# Patient Record
Sex: MALE | Race: WHITE | NOT HISPANIC OR LATINO | Employment: OTHER | URBAN - NONMETROPOLITAN AREA
[De-identification: names, ages, dates, MRNs, and addresses within clinical notes are randomized per-mention and may not be internally consistent; named-entity substitution may affect disease eponyms.]

---

## 2021-06-16 ENCOUNTER — HOSPITAL ENCOUNTER (INPATIENT)
Facility: OTHER | Age: 81
LOS: 2 days | Discharge: HOME | DRG: 378 | End: 2021-06-19
Attending: EMERGENCY MEDICINE | Admitting: FAMILY MEDICINE
Payer: MEDICARE

## 2021-06-16 DIAGNOSIS — K92.0 HEMATEMESIS, PRESENCE OF NAUSEA NOT SPECIFIED: ICD-10-CM

## 2021-06-16 DIAGNOSIS — Z86.73 PERSONAL HISTORY OF TRANSIENT CEREBRAL ISCHEMIA: ICD-10-CM

## 2021-06-16 DIAGNOSIS — Z11.52 ENCOUNTER FOR SCREENING LABORATORY TESTING FOR COVID-19 VIRUS: ICD-10-CM

## 2021-06-16 DIAGNOSIS — I10 ESSENTIAL HYPERTENSION, MALIGNANT: ICD-10-CM

## 2021-06-16 DIAGNOSIS — K92.2 GASTROINTESTINAL HEMORRHAGE, UNSPECIFIED GASTROINTESTINAL HEMORRHAGE TYPE: ICD-10-CM

## 2021-06-16 PROBLEM — K92.1 MELENA: Status: ACTIVE | Noted: 2021-06-16

## 2021-06-16 LAB
ABO + RH BLD: NORMAL
ABO + RH BLD: NORMAL
ALBUMIN SERPL-MCNC: 3.6 G/DL (ref 3.5–5.7)
ALP SERPL-CCNC: 52 U/L (ref 34–104)
ALT SERPL W P-5'-P-CCNC: 18 U/L (ref 7–52)
ANION GAP SERPL CALCULATED.3IONS-SCNC: 8 MMOL/L (ref 3–14)
APTT PPP: 28 SEC (ref 22–37)
AST SERPL W P-5'-P-CCNC: 16 U/L (ref 13–39)
BASOPHILS # BLD AUTO: 0.1 10E9/L (ref 0–0.2)
BASOPHILS NFR BLD AUTO: 0.6 %
BILIRUB SERPL-MCNC: 0.5 MG/DL (ref 0.3–1)
BLD GP AB SCN SERPL QL: NORMAL
BLOOD BANK CMNT PATIENT-IMP: NORMAL
BUN SERPL-MCNC: 41 MG/DL (ref 7–25)
CALCIUM SERPL-MCNC: 9.1 MG/DL (ref 8.6–10.3)
CHLORIDE SERPL-SCNC: 106 MMOL/L (ref 98–107)
CO2 SERPL-SCNC: 26 MMOL/L (ref 21–31)
CREAT SERPL-MCNC: 0.93 MG/DL (ref 0.7–1.3)
DIFFERENTIAL METHOD BLD: ABNORMAL
EOSINOPHIL # BLD AUTO: 0 10E9/L (ref 0–0.7)
EOSINOPHIL NFR BLD AUTO: 0.5 %
ERYTHROCYTE [DISTWIDTH] IN BLOOD BY AUTOMATED COUNT: 13.6 % (ref 10–15)
GFR SERPL CREATININE-BSD FRML MDRD: 78 ML/MIN/{1.73_M2}
GLUCOSE SERPL-MCNC: 152 MG/DL (ref 70–105)
HCT VFR BLD AUTO: 30.4 % (ref 40–53)
HGB BLD-MCNC: 10.1 G/DL (ref 13.3–17.7)
IMM GRANULOCYTES # BLD: 0.1 10E9/L (ref 0–0.4)
IMM GRANULOCYTES NFR BLD: 0.6 %
INR PPP: 1.06 (ref 0.86–1.14)
LABORATORY COMMENT REPORT: NORMAL
LACTATE BLD-SCNC: 1.6 MMOL/L (ref 0.7–2)
LYMPHOCYTES # BLD AUTO: 1.9 10E9/L (ref 0.8–5.3)
LYMPHOCYTES NFR BLD AUTO: 20.9 %
MCH RBC QN AUTO: 29 PG (ref 26.5–33)
MCHC RBC AUTO-ENTMCNC: 33.2 G/DL (ref 31.5–36.5)
MCV RBC AUTO: 87 FL (ref 78–100)
MONOCYTES # BLD AUTO: 0.7 10E9/L (ref 0–1.3)
MONOCYTES NFR BLD AUTO: 7.8 %
NEUTROPHILS # BLD AUTO: 6.2 10E9/L (ref 1.6–8.3)
NEUTROPHILS NFR BLD AUTO: 69.6 %
PLATELET # BLD AUTO: 208 10E9/L (ref 150–450)
POTASSIUM SERPL-SCNC: 3.7 MMOL/L (ref 3.5–5.1)
PROT SERPL-MCNC: 6.4 G/DL (ref 6.4–8.9)
RBC # BLD AUTO: 3.48 10E12/L (ref 4.4–5.9)
SARS-COV-2 RNA RESP QL NAA+PROBE: NEGATIVE
SODIUM SERPL-SCNC: 140 MMOL/L (ref 134–144)
SPECIMEN EXP DATE BLD: NORMAL
SPECIMEN SOURCE: NORMAL
WBC # BLD AUTO: 8.8 10E9/L (ref 4–11)

## 2021-06-16 PROCEDURE — U0005 INFEC AGEN DETEC AMPLI PROBE: HCPCS | Performed by: EMERGENCY MEDICINE

## 2021-06-16 PROCEDURE — 80053 COMPREHEN METABOLIC PANEL: CPT | Performed by: EMERGENCY MEDICINE

## 2021-06-16 PROCEDURE — 86900 BLOOD TYPING SEROLOGIC ABO: CPT | Performed by: EMERGENCY MEDICINE

## 2021-06-16 PROCEDURE — 85610 PROTHROMBIN TIME: CPT | Performed by: EMERGENCY MEDICINE

## 2021-06-16 PROCEDURE — 36415 COLL VENOUS BLD VENIPUNCTURE: CPT | Performed by: EMERGENCY MEDICINE

## 2021-06-16 PROCEDURE — 999N000157 HC STATISTIC RCP TIME EA 10 MIN

## 2021-06-16 PROCEDURE — 99285 EMERGENCY DEPT VISIT HI MDM: CPT | Performed by: EMERGENCY MEDICINE

## 2021-06-16 PROCEDURE — 86850 RBC ANTIBODY SCREEN: CPT | Performed by: EMERGENCY MEDICINE

## 2021-06-16 PROCEDURE — 83605 ASSAY OF LACTIC ACID: CPT | Performed by: EMERGENCY MEDICINE

## 2021-06-16 PROCEDURE — 258N000003 HC RX IP 258 OP 636: Performed by: EMERGENCY MEDICINE

## 2021-06-16 PROCEDURE — G0378 HOSPITAL OBSERVATION PER HR: HCPCS

## 2021-06-16 PROCEDURE — 99285 EMERGENCY DEPT VISIT HI MDM: CPT | Mod: 25 | Performed by: EMERGENCY MEDICINE

## 2021-06-16 PROCEDURE — 93010 ELECTROCARDIOGRAM REPORT: CPT | Performed by: INTERNAL MEDICINE

## 2021-06-16 PROCEDURE — 85025 COMPLETE CBC W/AUTO DIFF WBC: CPT | Performed by: EMERGENCY MEDICINE

## 2021-06-16 PROCEDURE — 93005 ELECTROCARDIOGRAM TRACING: CPT | Performed by: EMERGENCY MEDICINE

## 2021-06-16 PROCEDURE — 258N000003 HC RX IP 258 OP 636: Performed by: FAMILY MEDICINE

## 2021-06-16 PROCEDURE — 96361 HYDRATE IV INFUSION ADD-ON: CPT | Performed by: EMERGENCY MEDICINE

## 2021-06-16 PROCEDURE — 96374 THER/PROPH/DIAG INJ IV PUSH: CPT | Performed by: EMERGENCY MEDICINE

## 2021-06-16 PROCEDURE — 250N000011 HC RX IP 250 OP 636: Performed by: EMERGENCY MEDICINE

## 2021-06-16 PROCEDURE — 86901 BLOOD TYPING SEROLOGIC RH(D): CPT | Performed by: EMERGENCY MEDICINE

## 2021-06-16 PROCEDURE — C9113 INJ PANTOPRAZOLE SODIUM, VIA: HCPCS | Performed by: EMERGENCY MEDICINE

## 2021-06-16 PROCEDURE — U0003 INFECTIOUS AGENT DETECTION BY NUCLEIC ACID (DNA OR RNA); SEVERE ACUTE RESPIRATORY SYNDROME CORONAVIRUS 2 (SARS-COV-2) (CORONAVIRUS DISEASE [COVID-19]), AMPLIFIED PROBE TECHNIQUE, MAKING USE OF HIGH THROUGHPUT TECHNOLOGIES AS DESCRIBED BY CMS-2020-01-R: HCPCS | Performed by: EMERGENCY MEDICINE

## 2021-06-16 PROCEDURE — C9803 HOPD COVID-19 SPEC COLLECT: HCPCS | Performed by: EMERGENCY MEDICINE

## 2021-06-16 PROCEDURE — 85730 THROMBOPLASTIN TIME PARTIAL: CPT | Performed by: EMERGENCY MEDICINE

## 2021-06-16 RX ORDER — PROCHLORPERAZINE 25 MG
12.5 SUPPOSITORY, RECTAL RECTAL EVERY 12 HOURS PRN
Status: DISCONTINUED | OUTPATIENT
Start: 2021-06-16 | End: 2021-06-19 | Stop reason: HOSPADM

## 2021-06-16 RX ORDER — PROCHLORPERAZINE MALEATE 5 MG
5 TABLET ORAL EVERY 6 HOURS PRN
Status: DISCONTINUED | OUTPATIENT
Start: 2021-06-16 | End: 2021-06-19 | Stop reason: HOSPADM

## 2021-06-16 RX ORDER — SODIUM CHLORIDE 9 MG/ML
INJECTION, SOLUTION INTRAVENOUS CONTINUOUS
Status: DISCONTINUED | OUTPATIENT
Start: 2021-06-16 | End: 2021-06-17

## 2021-06-16 RX ORDER — ONDANSETRON 4 MG/1
4 TABLET, ORALLY DISINTEGRATING ORAL EVERY 6 HOURS PRN
Status: DISCONTINUED | OUTPATIENT
Start: 2021-06-16 | End: 2021-06-19 | Stop reason: HOSPADM

## 2021-06-16 RX ORDER — SODIUM CHLORIDE 9 MG/ML
INJECTION, SOLUTION INTRAVENOUS CONTINUOUS
Status: DISCONTINUED | OUTPATIENT
Start: 2021-06-16 | End: 2021-06-19

## 2021-06-16 RX ORDER — ACETAMINOPHEN 325 MG/1
650 TABLET ORAL EVERY 4 HOURS PRN
Status: DISCONTINUED | OUTPATIENT
Start: 2021-06-16 | End: 2021-06-19 | Stop reason: HOSPADM

## 2021-06-16 RX ORDER — ACETAMINOPHEN 650 MG/1
650 SUPPOSITORY RECTAL EVERY 4 HOURS PRN
Status: DISCONTINUED | OUTPATIENT
Start: 2021-06-16 | End: 2021-06-19 | Stop reason: HOSPADM

## 2021-06-16 RX ORDER — ONDANSETRON 2 MG/ML
4 INJECTION INTRAMUSCULAR; INTRAVENOUS EVERY 6 HOURS PRN
Status: DISCONTINUED | OUTPATIENT
Start: 2021-06-16 | End: 2021-06-19 | Stop reason: HOSPADM

## 2021-06-16 RX ADMIN — SODIUM CHLORIDE 1000 ML: 9 INJECTION, SOLUTION INTRAVENOUS at 19:43

## 2021-06-16 RX ADMIN — SODIUM CHLORIDE: 9 INJECTION, SOLUTION INTRAVENOUS at 22:50

## 2021-06-16 RX ADMIN — PANTOPRAZOLE SODIUM 80 MG: 40 INJECTION, POWDER, FOR SOLUTION INTRAVENOUS at 19:44

## 2021-06-16 ASSESSMENT — MIFFLIN-ST. JEOR: SCORE: 1623.46

## 2021-06-17 PROBLEM — K92.2 GASTROINTESTINAL HEMORRHAGE, UNSPECIFIED GASTROINTESTINAL HEMORRHAGE TYPE: Status: ACTIVE | Noted: 2021-06-16

## 2021-06-17 PROBLEM — K92.0 HEMATEMESIS, PRESENCE OF NAUSEA NOT SPECIFIED: Status: ACTIVE | Noted: 2021-06-16

## 2021-06-17 PROBLEM — R91.8 PULMONARY NODULES: Status: ACTIVE | Noted: 2021-06-17

## 2021-06-17 LAB
ANION GAP SERPL CALCULATED.3IONS-SCNC: 8 MMOL/L (ref 3–14)
BUN SERPL-MCNC: 33 MG/DL (ref 7–25)
CALCIUM SERPL-MCNC: 8.1 MG/DL (ref 8.6–10.3)
CHLORIDE SERPL-SCNC: 111 MMOL/L (ref 98–107)
CO2 SERPL-SCNC: 24 MMOL/L (ref 21–31)
CREAT SERPL-MCNC: 0.85 MG/DL (ref 0.7–1.3)
ERYTHROCYTE [DISTWIDTH] IN BLOOD BY AUTOMATED COUNT: 13.7 % (ref 10–15)
GFR SERPL CREATININE-BSD FRML MDRD: 87 ML/MIN/{1.73_M2}
GLUCOSE SERPL-MCNC: 108 MG/DL (ref 70–105)
HCT VFR BLD AUTO: 26.3 % (ref 40–53)
HGB BLD-MCNC: 8 G/DL (ref 13.3–17.7)
HGB BLD-MCNC: 8.5 G/DL (ref 13.3–17.7)
HGB BLD-MCNC: 8.9 G/DL (ref 13.3–17.7)
INTERPRETATION ECG - MUSE: NORMAL
MCH RBC QN AUTO: 28.2 PG (ref 26.5–33)
MCHC RBC AUTO-ENTMCNC: 32.3 G/DL (ref 31.5–36.5)
MCV RBC AUTO: 87 FL (ref 78–100)
PLATELET # BLD AUTO: 183 10E9/L (ref 150–450)
POTASSIUM SERPL-SCNC: 4.3 MMOL/L (ref 3.5–5.1)
RBC # BLD AUTO: 3.01 10E12/L (ref 4.4–5.9)
SODIUM SERPL-SCNC: 143 MMOL/L (ref 134–144)
WBC # BLD AUTO: 7.8 10E9/L (ref 4–11)

## 2021-06-17 PROCEDURE — 99222 1ST HOSP IP/OBS MODERATE 55: CPT | Mod: 25 | Performed by: SURGERY

## 2021-06-17 PROCEDURE — 86850 RBC ANTIBODY SCREEN: CPT | Performed by: FAMILY MEDICINE

## 2021-06-17 PROCEDURE — 86900 BLOOD TYPING SEROLOGIC ABO: CPT | Performed by: FAMILY MEDICINE

## 2021-06-17 PROCEDURE — 86901 BLOOD TYPING SEROLOGIC RH(D): CPT | Performed by: FAMILY MEDICINE

## 2021-06-17 PROCEDURE — 36415 COLL VENOUS BLD VENIPUNCTURE: CPT | Performed by: FAMILY MEDICINE

## 2021-06-17 PROCEDURE — 85027 COMPLETE CBC AUTOMATED: CPT | Performed by: FAMILY MEDICINE

## 2021-06-17 PROCEDURE — G0378 HOSPITAL OBSERVATION PER HR: HCPCS

## 2021-06-17 PROCEDURE — 85018 HEMOGLOBIN: CPT | Performed by: FAMILY MEDICINE

## 2021-06-17 PROCEDURE — C9113 INJ PANTOPRAZOLE SODIUM, VIA: HCPCS | Performed by: FAMILY MEDICINE

## 2021-06-17 PROCEDURE — 250N000011 HC RX IP 250 OP 636: Performed by: FAMILY MEDICINE

## 2021-06-17 PROCEDURE — 99222 1ST HOSP IP/OBS MODERATE 55: CPT | Performed by: FAMILY MEDICINE

## 2021-06-17 PROCEDURE — 250N000013 HC RX MED GY IP 250 OP 250 PS 637: Performed by: FAMILY MEDICINE

## 2021-06-17 PROCEDURE — 120N000001 HC R&B MED SURG/OB

## 2021-06-17 PROCEDURE — 80048 BASIC METABOLIC PNL TOTAL CA: CPT | Performed by: FAMILY MEDICINE

## 2021-06-17 RX ORDER — MULTIPLE VITAMINS W/ MINERALS TAB 9MG-400MCG
1 TAB ORAL DAILY
COMMUNITY

## 2021-06-17 RX ORDER — AMLODIPINE BESYLATE 10 MG/1
5 TABLET ORAL 2 TIMES DAILY
COMMUNITY

## 2021-06-17 RX ORDER — MULTIVIT-MIN/IRON/FOLIC ACID/K 18-600-40
1 CAPSULE ORAL 2 TIMES DAILY
COMMUNITY

## 2021-06-17 RX ORDER — CHLORAL HYDRATE 500 MG
1 CAPSULE ORAL DAILY
COMMUNITY

## 2021-06-17 RX ORDER — DOXAZOSIN 4 MG/1
2 TABLET ORAL AT BEDTIME
COMMUNITY

## 2021-06-17 RX ORDER — FINASTERIDE 5 MG/1
5 TABLET, FILM COATED ORAL DAILY
Status: DISCONTINUED | OUTPATIENT
Start: 2021-06-17 | End: 2021-06-19 | Stop reason: HOSPADM

## 2021-06-17 RX ORDER — CLOPIDOGREL BISULFATE 75 MG/1
75 TABLET ORAL DAILY
Status: ON HOLD | COMMUNITY
End: 2021-06-19

## 2021-06-17 RX ORDER — ATORVASTATIN CALCIUM 40 MG/1
40 TABLET, FILM COATED ORAL EVERY EVENING
Status: DISCONTINUED | OUTPATIENT
Start: 2021-06-17 | End: 2021-06-19 | Stop reason: HOSPADM

## 2021-06-17 RX ORDER — DOXAZOSIN 1 MG/1
1 TABLET ORAL AT BEDTIME
Status: ON HOLD | COMMUNITY
End: 2021-06-17 | Stop reason: DRUGHIGH

## 2021-06-17 RX ORDER — AMLODIPINE BESYLATE 5 MG/1
5 TABLET ORAL 2 TIMES DAILY
Status: ON HOLD | COMMUNITY
End: 2021-06-17 | Stop reason: DRUGHIGH

## 2021-06-17 RX ORDER — AMLODIPINE BESYLATE 5 MG/1
5 TABLET ORAL 2 TIMES DAILY
Status: DISCONTINUED | OUTPATIENT
Start: 2021-06-17 | End: 2021-06-19 | Stop reason: HOSPADM

## 2021-06-17 RX ORDER — DOXAZOSIN 1 MG/1
2 TABLET ORAL AT BEDTIME
Status: DISCONTINUED | OUTPATIENT
Start: 2021-06-17 | End: 2021-06-19 | Stop reason: HOSPADM

## 2021-06-17 RX ORDER — ATORVASTATIN CALCIUM 80 MG/1
40 TABLET, FILM COATED ORAL EVERY EVENING
COMMUNITY
End: 2021-06-29 | Stop reason: SINTOL

## 2021-06-17 RX ORDER — FINASTERIDE 5 MG/1
5 TABLET, FILM COATED ORAL DAILY
COMMUNITY

## 2021-06-17 RX ADMIN — ATORVASTATIN CALCIUM 40 MG: 40 TABLET, FILM COATED ORAL at 20:10

## 2021-06-17 RX ADMIN — PANTOPRAZOLE SODIUM 40 MG: 40 INJECTION, POWDER, FOR SOLUTION INTRAVENOUS at 09:39

## 2021-06-17 RX ADMIN — AMLODIPINE BESYLATE 5 MG: 5 TABLET ORAL at 21:27

## 2021-06-17 RX ADMIN — PANTOPRAZOLE SODIUM 40 MG: 40 INJECTION, POWDER, FOR SOLUTION INTRAVENOUS at 21:27

## 2021-06-17 RX ADMIN — FINASTERIDE 5 MG: 5 TABLET, FILM COATED ORAL at 13:49

## 2021-06-17 RX ADMIN — DOXAZOSIN 2 MG: 1 TABLET ORAL at 21:27

## 2021-06-17 ASSESSMENT — ACTIVITIES OF DAILY LIVING (ADL)
ADLS_ACUITY_SCORE: 16

## 2021-06-17 NOTE — PLAN OF CARE
Observation note: Pt admitted with GI bleed. Up with SBA, steady gait. IV infusing. Vitals table. No output. Sleeping without complaints. Glasses and bilat. Hearing aides at bedside.

## 2021-06-17 NOTE — ED TRIAGE NOTES
ED Nursing Triage Note (General)   ________________________________    Aaron Li is a 80 year old Male that presents to triage via private vehicle with complaints of a GI bleed. Patient was seen in the ED in [place] last night due to hematemesis.  Patient states occult stool was positive yesterday, however, at that time he was not having bloody stools.  Patient states, however, at 1130 and 1400 today he had episodes of black stool.  Patient called the ED and was informed to come in.   Significant symptoms had onset 24 hours ago.  GCS-15  Breathing noted as Normal  Action taken: level 3      PRE HOSPITAL PRIOR LIVING SITUATION-home

## 2021-06-17 NOTE — CONSULTS
[facility]    Surgical Consultation    Date of Admission:  6/16/2021    Assessment & Plan   Aaron Li is a 80 year old male who was admitted on 6/16/2021. I was asked to see the patient for upper GI bleed.    Principal Problem:    GI bleed    Assessment: acute-upper    Plan: agree with plt administration. BUN improved-not having numerous stools. Clear liquids ok. Will plan on EGD Friday. Continue to monitor clinical status and labs.   Active Problems:    Hematemesis    Assessment: resolved    Melena    Assessment: stable    Plan: as above    Pulmonary nodules    Plan: per hospitalist      DVT Prophylaxis: bleeding, scd only  Code Status: Full Code    Chelsy Malloy    Reason for Consult   Reason for consult: I was asked by Dr. Johnson to evaluate this patient for upper GI bleeding.    Primary Care Physician   Matthew Prieto    Chief Complaint   Hematemesis followed by black stools    History is obtained from the patient and chart review    History of Present Illness   Aaron Li is a 80 year old male who presents with black stools after having hematemesis and being seen in [facility]. He was suspected to have an upper GI bleed. He was stable during his observation period there. He was set up for endoscopy in July. He continued to have black stools and decided he couldn't wait until July. He presented to the ed.   He was admitted. He has gotten IV fluids. No abdominal pain or nausea. No hematemesis. Still having some black in his stools but no diarrhea and no clots.   He was a little light headed last night. Today feels better. Tolerating clear liquids.   He has been on Plavix for a stroke 10 years ago. He stopped taking it about 5 days ago because he had a small cut on his hand that wouldn't quit bleeding.   Past Medical History    I have reviewed this patient's medical history and updated it with pertinent information if needed.   No past medical history on file.    Past Surgical History   I  have reviewed this patient's surgical history and updated it with pertinent information if needed.  No past surgical history on file.    Prior to Admission Medications   Prior to Admission Medications   Prescriptions Last Dose Informant Patient Reported? Taking?   BONE MEAL-VITAMIN D PO 6/16/2021 at 1100  Yes Yes   Sig: Take 3 tablets by mouth daily   Glucosamine Sulfate 1000 MG TABS 6/16/2021 at 1100  Yes Yes   Sig: Take 1 tablet by mouth 2 times daily    Vitamin D, Cholecalciferol, 25 MCG (1000 UT) TABS 6/16/2021 at 1100  Yes Yes   Sig: Take 1 tablet by mouth 2 times daily    amLODIPine (NORVASC) 10 MG tablet 6/16/2021 at 1100  Yes Yes   Sig: Take 5 mg by mouth 2 times daily   atorvastatin (LIPITOR) 80 MG tablet 6/16/2021 at 0100  Yes Yes   Sig: Take 40 mg by mouth every evening    clopidogrel (PLAVIX) 75 MG tablet Past Week at Unknown time  Yes Yes   Sig: Take 75 mg by mouth daily   doxazosin (CARDURA) 4 MG tablet 6/16/2021 at 0100  Yes Yes   Sig: Take 2 mg by mouth At Bedtime   finasteride (PROSCAR) 5 MG tablet 6/16/2021 at 0100  Yes Yes   Sig: Take 5 mg by mouth daily   fish oil-omega-3 fatty acids 1000 MG capsule 6/16/2021 at 1100  Yes Yes   Sig: Take 1 g by mouth daily    metFORMIN (GLUCOPHAGE) 1000 MG tablet 6/16/2021 at 1100  Yes Yes   Sig: Take 500 mg by mouth 2 times daily (with meals)   multivitamin w/minerals (MULTI-VITAMIN) tablet 6/16/2021 at 0100  Yes Yes   Sig: Take 1 tablet by mouth daily      Facility-Administered Medications: None     Allergies   No Known Allergies    Social History   I have reviewed this patient's social history and updated it with pertinent information if needed. Aaron Li      Family History   I have reviewed this patient's family history and updated it with pertinent information if needed.   No family history on file.    REVIEW OF SYSTEMS  GENERAL: No fevers or chills. has fatigue, no weight loss.  HEENT: No sinus drainage. No changes with vision or hearing. No  difficulty swallowing.   LYMPHATICS:  No swollen nodes in axilla, neck or groin.  CARDIOVASCULAR: Denies chest pain, palpitations and dyspnea on exertion.  PULMONARY: No shortness of breath or cough. No increase in sputum production.  GI: as above. No constipation or diarrhea.  : No dysuria or hematuria.  SKIN: No recent rashes or ulcers.   HEMATOLOGY:  Has easy bruising and bleeding.  ENDOCRINE:  Has some early diabetes or thyroid problems.  NEUROLOGY:  No history of seizures or headaches. No new motor or sensory changes.    Physical Exam   Temp: 98.7  F (37.1  C) Temp src: Tympanic BP: 123/65 Pulse: 75   Resp: 16 SpO2: 98 % O2 Device: None (Room air)    Vital Signs with Ranges  Temp:  [32.2  F (0.1  C)-98.7  F (37.1  C)] 98.7  F (37.1  C)  Pulse:  [75-86] 75  Resp:  [10-29] 16  BP: (103-142)/(61-94) 123/65  SpO2:  [96 %-98 %] 98 %  202 lbs 6.4 oz    Constitutional: NAD, pleasant and cooperative  HEENT: normocephalic, no icterus, no nasal drainage, no oral lesions, mucus membranes pink and moist  Respiratory: lungs clear to ausculation bilaterally, no respiratory distress  Cardiovascular: heart regular rate and rhythm, no murmur  Abdomen: bowel sounds +, soft and non-distended, no tenderness, no peritoneal signs  Lymph/Hematologic: No axillary or cervical adenopathy  Skin: pink, warm and dry. No rash or ulceration  Musculoskeletal: calves soft and non-tender  Neurologic: grossly intact, AAO x 3  Psychiatric: appropriate affect    Data   -Data reviewed today: All pertinent laboratory and imaging results from this encounter were reviewed. I personally reviewed no images or EKG's today.  Recent Labs   Lab 06/17/21  1321 06/17/21  0512 06/16/21  1939   WBC  --  7.8 8.8   HGB 8.9* 8.5* 10.1*   MCV  --  87 87   PLT  --  183 208   INR  --   --  1.06   NA  --  143 140   POTASSIUM  --  4.3 3.7   CHLORIDE  --  111* 106   CO2  --  24 26   BUN  --  33* 41*   CR  --  0.85 0.93   ANIONGAP  --  8 8   SEBASTIÁN  --  8.1* 9.1   GLC  --   108* 152*   ALBUMIN  --   --  3.6   PROTTOTAL  --   --  6.4   BILITOTAL  --   --  0.5   ALKPHOS  --   --  52   ALT  --   --  18   AST  --   --  16       No results found for this or any previous visit (from the past 24 hour(s)).

## 2021-06-17 NOTE — PROGRESS NOTES
Observation paper work given to patient and spouse no questions at this time. Report given to DYANA Jay.

## 2021-06-17 NOTE — PROGRESS NOTES
SAFETY CHECKLIST  ID Bands and Risk clasps correct and in place (DNR, Fall risk, Allergy, Latex, Limb):  Yes  All Lines Reconciled and labeled correctly: Yes  Whiteboard updated:Yes  Environmental interventions (bed/chair alarm on, call light, side rails, restraints, sitter....): Yes  Verify Tele #: NA

## 2021-06-17 NOTE — PROGRESS NOTES
Chart accessed for chart review pending admission to [facility].  Misty Crouch RN on 6/16/2021 at 8:55 PM

## 2021-06-17 NOTE — PLAN OF CARE
Observation note: Sleeping without complaints. No s/s of bleeding. Labs to be drawn this am. Has been NPO since admit. Vitals stable. Abodmen is distended and firm. No pain. Pt states this is normal for him. Became lightheaded after ambulating, agreeable to have assistance with mobility.

## 2021-06-17 NOTE — ED PROVIDER NOTES
"[facility]  Emergency Department Visit Note    GI Bleeding      History of Present Illness     HPI:  Aaron Li is a 80 year old old male presenting with hematemesis. This started 1 days ago. The emesis was bright red in color. He went to [facility] ED yesterday and was discahrged to home. His hgb was 11.5 and he had guaiac positive stools. Today he had two black stools and he called the ED and was told to go to another ED with surgery. His daughter loves her so they drove to [facility]. He has no abdominal pin. He had a CT scan yesterday that was normal. He has not had similar bleeding in the past. There is no associated rectal bright red bleeding. He is compliant with all medications but discontinued plavix yesterday. There is not associated light headedness. No fever, chills, chest pain, abdominal pain, shortness of breath.     Medications:  Prior to Admission medications    Not on File       Allergies:  No Known Allergies    Problem List:  Patient Active Problem List   Diagnosis     Hematemesis     Essential hypertension     Melanoma of neck (H)     Stroke/cerebrovascular accident (H)     Melena     GI bleed       Past Medical History:  No past medical history on file.    Past Surgical History:  No past surgical history on file.    Social History:  Social History     Tobacco Use     Smoking status: Not on file   Substance Use Topics     Alcohol use: Not on file     Drug use: Not on file       Review of Systems:  complete review of systems obtained and pertinent positive and negative findings noted in HPI. Review of systems otherwise negative.    Physical Exam     Vital signs: /75   Pulse 76   Temp 98.6  F (37  C) (Tympanic)   Resp 20   Ht 1.753 m (5' 9\")   Wt 91.8 kg (202 lb 6.4 oz)   SpO2 98%   BMI 29.89 kg/m      Physical Exam:    General: awake and alert, comfortable  HEENT: atraumatic, conjunctival pallor bilaterally, no nasal discharge, neck supple  Chest: " clear to auscultation bilaterally without wheezes or crackles, non labored respirations, symmetric chest rise  Cardiovascular: regular rate and rhythm, no murmurs or gallops  Abdomen: soft, nontender, no rebound or guarding, nondistended  Extremities: no deformities, edema, or tenderness  Skin: warm, dry, no rashes  Neuro: alert and oriented x 3, moving extremities x 4, ambulates without difficulty        Medical Decision Making & ED Course     Aaron Li is a 80 year old male presenting with hematemesis and now andre stools. Differential includes esophageal varices, bleeding ulcer, severe gastritis, renny samuel tear, Boerhaave syndrome, malignancy, arteriovenous malformation. History and exam are suggestive of an upper gastrointestinal source of bleeding and concerning for potential for life threatening hemorrhage. Patient was given an IV proton pump inhibitor for possible ulcerative or gastritis source. Given risk for esophageal varices, octreotide was not indicated. The patient did not require reversal of an anticoagulant. Vital signs were stable.   ED Course as of Jun 16 2352 Wed Jun 16, 2021 1951 This is  minimal drop form 11.5 yesterday and his vital signs remain stable   Hemoglobin(!): 10.1   1953 Lactic Acid: 1.6   2009 Case discussed with Dr Malloy. OK to admit to medicine for evaluation in the morning.            I have reviewed the patients ECG, laboratory studies, outside imaging, and medical records..    Diagnosis & Disposition     Diagnosis:  1. Hematemesis, presence of nausea not specified    2. Gastrointestinal hemorrhage, unspecified gastrointestinal hemorrhage type        Disposition:  Admit    MD Josh Lezama Theresa M, MD  06/16/21 2351       Tracy Moreno MD  06/16/21 2352

## 2021-06-17 NOTE — H&P
[facility]    History and Physical  Hospitalist       Date of Admission:  6/16/2021    Assessment & Plan   Aaron Li is a 80 year old male who presents with melena and hematemesis.       GI bleed.  Vital signs stable.  Hemoglobin on admission was 10.1.  8.5 today.  Not had any further dark stool or vomitus in almost 24 hours.    Hematemesis    Melena.  Outside CT imaging showed pulmonary nodules as well as an area of thickening in the stomach which may be consistent with ulcer.  -admit inpatient  -surgery consult for EGD/colon  -holding plavix and anticoagulation, last dose was 5-6 days ago, per patient  -PPI  -ok for clear liquid diet today. NPO at 5am tomorrow.   -ambulate as able.   -antiemetics prn  -avoid all NSAIDs, ASA    Hx of stroke, on plavix up until 2 days ago.   -holding plavix  -transfuse platelets if active bleeding. Last plavix several days ago. Monitor.     Pulmonary nodules noted incidentally on outside imaging  -outpatient follow up    DVT Prophylaxis: none due to GIB  Code Status: Full Code    Rasheeda Johnson    Primary Care Physician   Matthew Prieto    Chief Complaint   Hematemesis and melena.     History is obtained from the patient and chart review.    History of Present Illness   Aaron Li is a 80 year old male who presents with one episode of vomiting blight bright red blood about 2 days ago along with dark stools after that.  Stools have been more liquidy than usual.  He was seen in the emergency room in [place] where he lives.  Plavix was held and his hemoglobin and vital signs remained otherwise stable.  He was recommended to follow-up as scheduled with his team for endoscopy and a colonoscopy in July.  However, patient was concerned about ongoing symptoms and so presented to our emergency room here.  He tells me he only had the one episode of bright red vomitus which was on Tuesday.  Last dark stool diarrhea was yesterday around 3:30 PM.  Has  done well overnight since his admission.  I spoke briefly with on-call surgery who will follow up with patient today.  He tells me his last dose of Plavix was about 5 or 6 days ago when he self discontinued because he had a cut on his hand that would not stop bleeding.  He denies nausea or abdominal pain today.  He does not take any other aspirin or NSAIDs other than maybe 2 aspirin a year.  He does not smoke or use any alcohol.  No other new or recent medication changes.  Denies chest pain or leg swelling.  No shortness of breath.  No other sick contacts or recent travel.    Past Medical History    I have reviewed this patient's medical history and updated it with pertinent information if needed.   No past medical history on file.    Past Surgical History   I have reviewed this patient's surgical history and updated it with pertinent information if needed.  No past surgical history on file.    Prior to Admission Medications   None     Allergies   No Known Allergies    Social History   I have reviewed this patient's social history and updated it with pertinent information if needed. Aaron Li      Family History   I have reviewed this patient's family history and updated it with pertinent information if needed.   No family history on file.    Review of Systems     REVIEW OF SYSTEMS:    Constitutional: normal energy and appetite, no recent sick contacts  Eyes: no changes in vision  Ears, nose, mouth, throat, and face: no mouth sores, dysphagia, or odynophagia  Respiratory: no shortness of breath, cough, or wheezing. No aspiration symptoms.   Cardiovascular: no chest pain, palpitations, orthopnea, increased lower extremity edema, or syncope.   Gastrointestinal: Dark stool and one episode of bright red vomitus.  No abdominal pain.  Genitourinary: no dysuria, hematuria, urgency or frequency.   Hematologic/lymphatic: no unintentional weight loss or night sweats.  Musculoskeletal: no pain to extremities or falls.    Neurological: no new weakness, tingling, numbness.   Psychiatric: no hallucinations ordelusions.  Endocrine:  not a known diabetic.     Additions to the above include: See HPI    Physical Exam   Temp: 98.2  F (36.8  C) Temp src: Tympanic BP: 120/70 Pulse: 80   Resp: 16 SpO2: 97 % O2 Device: None (Room air)    Vital Signs with Ranges  Temp:  [97.2  F (36.2  C)-98.6  F (37  C)] 98.2  F (36.8  C)  Pulse:  [76-86] 80  Resp:  [10-29] 16  BP: (103-142)/(61-94) 120/70  SpO2:  [96 %-98 %] 97 %  202 lbs 6.4 oz    Constitutional: Awake, alert and oriented.  No acute distress.  Appears stated age  Eyes: Extraocular muscles intact.  Nonicteric  HEENT: Moist mucous membranes.  Lids normal.  Respiratory: Clear to auscultation bilaterally.  No wheezing, rhonchi, rales.  Cardiovascular: Regular rate.  No murmur.  No lower extremity edema  GI: Abdomen soft, nontender, nondistended  Skin: Warm, dry, intact.  No concerning bruises, rashes or petechiae.  Musculoskeletal: Moves arms and legs equally normally.  Neurologic: No focal deficits  Psychiatric: Appropriate affect and insight    Data   Data reviewed today:  I personally reviewed no images or EKG's today.    I have reviewed outside report on his CT read.  Question of thickening of the stomach.      Recent Labs   Lab 06/17/21  0512 06/16/21  1939   WBC 7.8 8.8   HGB 8.5* 10.1*   MCV 87 87    208   INR  --  1.06    140   POTASSIUM 4.3 3.7   CHLORIDE 111* 106   CO2 24 26   BUN 33* 41*   CR 0.85 0.93   ANIONGAP 8 8   SEBASTIÁN 8.1* 9.1   * 152*   ALBUMIN  --  3.6   PROTTOTAL  --  6.4   BILITOTAL  --  0.5   ALKPHOS  --  52   ALT  --  18   AST  --  16       No results found for this or any previous visit (from the past 24 hour(s)).

## 2021-06-17 NOTE — PHARMACY-ADMISSION MEDICATION HISTORY
Pharmacy -- Admission Medication Reconciliation     Prior to admission (PTA) medications were reviewed and the patient's PTA medication list was updated.    Sources Consulted: [employer] Scripts, [facility] Everywhere, [facility] Everywhere. Patient on telephone, wife with her list    [facility] chante [facility] and patient on telephone x2    Faxed [employer] (no response at time of note)    The reliability of this Medication Reconciliation is: Reliability: Borderline reliable    The following significant changes were made:  Added pharmacies  Updated last doses  Added amlodipine- reentered to match product from [facility]  Added atorvastatin  Added bone meal  Added clopidogrel  Added doxazosin, then changed dose from 1 mg per Care Everywhere to 2 mg per [facility] and patient  Added finasteride  Added fish oil  Added glucosamine  Added metformin, reentered metformin to match product from [facility]  Added multi vitamin  Added vitamin D  (have not confirmed above with wife or pharmacies yet)    Wife and patient report no OTC use (no nsaids)    In addition, the patient's allergies were reviewed with the patient's records and patient and left as follows:   Allergies: Patient has no known allergies.    The pharmacist WILL review with the patient and WIFE that all personal medications should be removed from the building or locked in the belongings safe.  Patient shall only take medications ordered by the physician and administered by the nursing staff.     Medication barriers identified: wife in charge of medications, did not take metformin and clopidogrel for 5 to 6 days due to cut and not feeling well   Medication adherence concerns: none noted   Understanding of emergency medications: no glucose    Abigail Sandoval RPH, 6/17/2021,  9:35 AM   Abigail Sandoval RPH on 6/17/2021 at 11:10 AM    Updated with [facility] doses/products.  Abigail Sandoval RPH on 6/17/2021 at 11:43 AM

## 2021-06-17 NOTE — PLAN OF CARE
Patient has been alert and oriented. Patient has not had any complaints of pain or nausea. No stools or emesis. Patient is up standby, Lungs clear, Clear di  et, no tele. Patient has old wound to back of neck from cancer. Will continue to monitor. Irving Santos RN on 6/17/2021 at 5:56 PM

## 2021-06-17 NOTE — PROGRESS NOTES
Admission Note    Data:  Aaron Li admitted to 301 from emergency room at 2205.      Action:  Dr. Johnson has been notified of admission. Pt oriented to unit, call light in reach.     Response:  Patient tolerated transfer.

## 2021-06-18 ENCOUNTER — ANESTHESIA EVENT (OUTPATIENT)
Dept: SURGERY | Facility: OTHER | Age: 81
DRG: 378 | End: 2021-06-18
Payer: MEDICARE

## 2021-06-18 ENCOUNTER — ANESTHESIA (OUTPATIENT)
Dept: SURGERY | Facility: OTHER | Age: 81
DRG: 378 | End: 2021-06-18
Payer: MEDICARE

## 2021-06-18 LAB — HGB BLD-MCNC: 8.3 G/DL (ref 13.3–17.7)

## 2021-06-18 PROCEDURE — 99232 SBSQ HOSP IP/OBS MODERATE 35: CPT | Performed by: FAMILY MEDICINE

## 2021-06-18 PROCEDURE — 258N000003 HC RX IP 258 OP 636: Performed by: FAMILY MEDICINE

## 2021-06-18 PROCEDURE — 43239 EGD BIOPSY SINGLE/MULTIPLE: CPT | Performed by: SURGERY

## 2021-06-18 PROCEDURE — 43239 EGD BIOPSY SINGLE/MULTIPLE: CPT | Performed by: NURSE ANESTHETIST, CERTIFIED REGISTERED

## 2021-06-18 PROCEDURE — 250N000013 HC RX MED GY IP 250 OP 250 PS 637: Performed by: FAMILY MEDICINE

## 2021-06-18 PROCEDURE — 250N000011 HC RX IP 250 OP 636: Performed by: FAMILY MEDICINE

## 2021-06-18 PROCEDURE — 99232 SBSQ HOSP IP/OBS MODERATE 35: CPT | Mod: 25 | Performed by: SURGERY

## 2021-06-18 PROCEDURE — 88342 IMHCHEM/IMCYTCHM 1ST ANTB: CPT

## 2021-06-18 PROCEDURE — 99100 ANES PT EXTEME AGE<1 YR&>70: CPT | Performed by: NURSE ANESTHETIST, CERTIFIED REGISTERED

## 2021-06-18 PROCEDURE — 120N000001 HC R&B MED SURG/OB

## 2021-06-18 PROCEDURE — 250N000009 HC RX 250: Performed by: NURSE ANESTHETIST, CERTIFIED REGISTERED

## 2021-06-18 PROCEDURE — 85018 HEMOGLOBIN: CPT | Performed by: FAMILY MEDICINE

## 2021-06-18 PROCEDURE — 250N000011 HC RX IP 250 OP 636: Performed by: NURSE ANESTHETIST, CERTIFIED REGISTERED

## 2021-06-18 PROCEDURE — C9113 INJ PANTOPRAZOLE SODIUM, VIA: HCPCS | Performed by: FAMILY MEDICINE

## 2021-06-18 PROCEDURE — 0DB68ZX EXCISION OF STOMACH, VIA NATURAL OR ARTIFICIAL OPENING ENDOSCOPIC, DIAGNOSTIC: ICD-10-PCS | Performed by: SURGERY

## 2021-06-18 PROCEDURE — 0W3P8ZZ CONTROL BLEEDING IN GASTROINTESTINAL TRACT, VIA NATURAL OR ARTIFICIAL OPENING ENDOSCOPIC: ICD-10-PCS | Performed by: SURGERY

## 2021-06-18 PROCEDURE — 88305 TISSUE EXAM BY PATHOLOGIST: CPT

## 2021-06-18 PROCEDURE — 36415 COLL VENOUS BLD VENIPUNCTURE: CPT | Performed by: FAMILY MEDICINE

## 2021-06-18 RX ORDER — PROPOFOL 10 MG/ML
INJECTION, EMULSION INTRAVENOUS CONTINUOUS PRN
Status: DISCONTINUED | OUTPATIENT
Start: 2021-06-18 | End: 2021-06-18

## 2021-06-18 RX ORDER — LIDOCAINE 40 MG/G
CREAM TOPICAL
Status: DISCONTINUED | OUTPATIENT
Start: 2021-06-18 | End: 2021-06-18 | Stop reason: HOSPADM

## 2021-06-18 RX ORDER — SODIUM CHLORIDE, SODIUM LACTATE, POTASSIUM CHLORIDE, CALCIUM CHLORIDE 600; 310; 30; 20 MG/100ML; MG/100ML; MG/100ML; MG/100ML
INJECTION, SOLUTION INTRAVENOUS CONTINUOUS
Status: DISCONTINUED | OUTPATIENT
Start: 2021-06-18 | End: 2021-06-18 | Stop reason: HOSPADM

## 2021-06-18 RX ORDER — PROPOFOL 10 MG/ML
INJECTION, EMULSION INTRAVENOUS PRN
Status: DISCONTINUED | OUTPATIENT
Start: 2021-06-18 | End: 2021-06-18

## 2021-06-18 RX ORDER — LIDOCAINE HYDROCHLORIDE 20 MG/ML
INJECTION, SOLUTION INFILTRATION; PERINEURAL PRN
Status: DISCONTINUED | OUTPATIENT
Start: 2021-06-18 | End: 2021-06-18

## 2021-06-18 RX ORDER — SODIUM CHLORIDE, SODIUM LACTATE, POTASSIUM CHLORIDE, CALCIUM CHLORIDE 600; 310; 30; 20 MG/100ML; MG/100ML; MG/100ML; MG/100ML
INJECTION, SOLUTION INTRAVENOUS CONTINUOUS
Status: DISCONTINUED | OUTPATIENT
Start: 2021-06-18 | End: 2021-06-18

## 2021-06-18 RX ADMIN — PANTOPRAZOLE SODIUM 40 MG: 40 INJECTION, POWDER, FOR SOLUTION INTRAVENOUS at 09:47

## 2021-06-18 RX ADMIN — PROPOFOL 120 MCG/KG/MIN: 10 INJECTION, EMULSION INTRAVENOUS at 14:55

## 2021-06-18 RX ADMIN — FINASTERIDE 5 MG: 5 TABLET, FILM COATED ORAL at 09:47

## 2021-06-18 RX ADMIN — ATORVASTATIN CALCIUM 40 MG: 40 TABLET, FILM COATED ORAL at 20:33

## 2021-06-18 RX ADMIN — SODIUM CHLORIDE, POTASSIUM CHLORIDE, SODIUM LACTATE AND CALCIUM CHLORIDE: 600; 310; 30; 20 INJECTION, SOLUTION INTRAVENOUS at 12:21

## 2021-06-18 RX ADMIN — LIDOCAINE HYDROCHLORIDE 40 MG: 20 INJECTION, SOLUTION INFILTRATION; PERINEURAL at 14:55

## 2021-06-18 RX ADMIN — SODIUM CHLORIDE: 9 INJECTION, SOLUTION INTRAVENOUS at 01:18

## 2021-06-18 RX ADMIN — AMLODIPINE BESYLATE 5 MG: 5 TABLET ORAL at 09:47

## 2021-06-18 RX ADMIN — DOXAZOSIN 2 MG: 1 TABLET ORAL at 20:32

## 2021-06-18 RX ADMIN — PANTOPRAZOLE SODIUM 40 MG: 40 INJECTION, POWDER, FOR SOLUTION INTRAVENOUS at 22:20

## 2021-06-18 RX ADMIN — PROPOFOL 50 MG: 10 INJECTION, EMULSION INTRAVENOUS at 14:55

## 2021-06-18 RX ADMIN — AMLODIPINE BESYLATE 5 MG: 5 TABLET ORAL at 20:33

## 2021-06-18 RX ADMIN — SODIUM CHLORIDE: 9 INJECTION, SOLUTION INTRAVENOUS at 17:14

## 2021-06-18 ASSESSMENT — ACTIVITIES OF DAILY LIVING (ADL)
ADLS_ACUITY_SCORE: 16

## 2021-06-18 NOTE — PLAN OF CARE
"Pt A & O x 4, afebrile, VSS. Bowel sounds active, abdomen rounded, firm and tender in all quadrants upon palpation. Pt denies any nausea or vomiting, NPO, last cl. Liquids @ 1000 today, consent done. Pt denies pain, will continue to monitor.            /73   Pulse 74   Temp 99.3  F (37.4  C) (Tympanic)   Resp 20   Ht 1.753 m (5' 9\")   Wt 91.8 kg (202 lb 6.4 oz)   SpO2 96%   BMI 29.89 kg/m      "

## 2021-06-18 NOTE — PLAN OF CARE
Patient is pleasant. Vitals are WNL. Patient denied pain. Lung sounds are clear. Patient denies nausea or vomiting. Patient states that he has not had a bowel movement. The patient's last hemoglobin was 8.   Recent Labs   Lab 06/17/21  2100 06/17/21  1321 06/17/21  0512 06/16/21  1939   HGB 8.0* 8.9* 8.5* 10.1*     No new concerns at this time. Will continue to monitor.  Irving Finley RN on 6/18/2021 at 12:55 AM

## 2021-06-18 NOTE — PROGRESS NOTES
[facility]    Hospitalist Progress Note      Assessment & Plan   Aaron Li is a 80 year old male who was admitted on 6/16/2021.  He presented with hematemesis and melena, with a decreased hemoglobin.    Principal Problem:    GI bleed    Assessment: Outside CT report reviewed.  Likely a gastric ulcer.  Hemoglobin has stabilized.  There is no evidence of further bleeding.    Plan: Upper GI endoscopy this afternoon.  Active Problems:    Stroke/cerebrovascular accident (H)    Assessment: Patient has been stable on medication including Plavix.  He has been off Plavix now for about 7 days.  He is having no symptoms.    Plan: We will restart Plavix after his EGD results are known.      Diet: NPO per Anesthesia Guidelines for Procedure/Surgery Except for: Meds    DVT Prophylaxis: Ambulatory in the room.  No prophylaxis due to GI bleed.  Corcoran Catheter: not present  Code Status: Full Code           Disposition Plan   Expected discharge: Tomorrow, recommended to prior living arrangement once Diagnostic testing has been completed and patient is stable..  Entered: GABRIELLE MONTGOMERY MD 06/18/2021, 10:28 AM       The patient's care was discussed with the Bedside Nurse and Patient.  And Dr. Malloy.    GABRIELLE MONTGOMERY MD  Hospitalist Service  [facility]  Contact information available via [facility] Paging/Directory    ______________________________________________________________________    Interval History   Denies pain.  Says he feels better.  No dizziness, lightheadedness, or cardiopulmonary symptoms.  No further melena or hematemesis.  Denies nausea.    -Data reviewed today: I reviewed all new labs and imaging results over the last 24 hours. I personally reviewed no images or EKG's today.    Physical Exam   Temp: 99.3  F (37.4  C) Temp src: Tympanic BP: 116/73 Pulse: 74   Resp: 20 SpO2: 96 % O2 Device: None (Room air)    Vitals:    06/16/21 1917 06/16/21 2208   Weight: 95.3  kg (210 lb) 91.8 kg (202 lb 6.4 oz)     Vital Signs with Ranges  Temp:  [98.3  F (36.8  C)-99.3  F (37.4  C)] 99.3  F (37.4  C)  Pulse:  [72-83] 74  Resp:  [16-20] 20  BP: (112-138)/(57-75) 116/73  SpO2:  [95 %-98 %] 96 %  I/O last 3 completed shifts:  In: 1766 [I.V.:1766]  Out: 725 [Urine:725]    Constitutional: Awake and alert, sitting up in the chair in no apparent distress.  Pleasant and cooperative.  Respiratory: Lungs are clear  Cardiovascular: Regular rhythm, no murmur gallop  GI: Soft, minimal epigastric tenderness, no rebound or guarding, no hepatosplenomegaly, bowel sounds normal.  Skin/Integumen: Warm and dry, no diaphoresis  Other: Illogically intact    Medications     lactated ringers       - MEDICATION INSTRUCTIONS -       - MEDICATION INSTRUCTIONS -       sodium chloride 75 mL/hr at 06/18/21 0118       amLODIPine  5 mg Oral BID     atorvastatin  40 mg Oral QPM     doxazosin  2 mg Oral At Bedtime     finasteride  5 mg Oral Daily     pantoprazole (PROTONIX) IV  40 mg Intravenous Q12H     sodium chloride (PF)  3 mL Intracatheter Q8H       Data   Recent Labs   Lab 06/18/21  0758 06/17/21  2100 06/17/21  1321 06/17/21  0512 06/16/21  1939   WBC  --   --   --  7.8 8.8   HGB 8.3* 8.0* 8.9* 8.5* 10.1*   MCV  --   --   --  87 87   PLT  --   --   --  183 208   INR  --   --   --   --  1.06   NA  --   --   --  143 140   POTASSIUM  --   --   --  4.3 3.7   CHLORIDE  --   --   --  111* 106   CO2  --   --   --  24 26   BUN  --   --   --  33* 41*   CR  --   --   --  0.85 0.93   ANIONGAP  --   --   --  8 8   SEBASTIÁN  --   --   --  8.1* 9.1   GLC  --   --   --  108* 152*   ALBUMIN  --   --   --   --  3.6   PROTTOTAL  --   --   --   --  6.4   BILITOTAL  --   --   --   --  0.5   ALKPHOS  --   --   --   --  52   ALT  --   --   --   --  18   AST  --   --   --   --  16       No results found for this or any previous visit (from the past 24 hour(s)).

## 2021-06-18 NOTE — OP NOTE
PROCEDURE NOTE    SURGEON: Chesly Malloy MD.    PRE-OP DIAGNOSIS:   Upper gi bleeding      POST-OP DIAGNOSIS: gastric ulcer in cardia, small hiatal hernia    PROCEDURE PLANNED:   Diagnostic EGD, flexible        PROCEDURE PERFORMED:  EGD with biopsy, flexible    SPECIMEN:  Antrum, cardia biopsies    ANESTHESIA: See anesthesia record, Coverage requested due to: age more than 71    ESTIMATED BLOOD LOSS: None    COMPLICATIONS:  None    INDICATION FORTHE PROCEDURE: The patient is a 80 year oldmale. The patient presents with gi bleeding-suspected upper source. I explained to the patient the risks, benefits and alternatives to diagnostic EGD for evaluating for a bleeding site. We specifically discussed the risks of bleeding, infection, perforation and the risks of sedation. The patient's questions were answered and the patient wished to proceed. Informed consent paperwork was completed.    PROCEDURE: The patient was taken to the endoscopy suite. Appropriate monitors were attached. The patient was placed in the left lateral decubitus position. Bite block was positioned.Timeout was performed confirming the patient's identity and procedure to be performed. After appropriate sedation was confirmed, the flexible endoscope was advanced into the oropharynx. The posterior oropharynx appeared grossly normal. The scope was advanced into the proximal esophagus. The esophagus was insufflated with air. The scope was advanced under direct visualization. No acute abnormalities of the esophagus were noted. The scope was advanced into the stomach. An ulcer with exudate at the base was noted in the cardia. No active bleeding was noted. The scope was advanced through the pylorus into the duodenal bulb. The bulb and distal duodenum appeared grossly normal. There was superficial irritation of the pyloric channel. The scope was withdrawn back into the stomach. Antral biopsy was obtained and sent to pathology. The scope was retroflexed and the  GE junction inspected. Hiatal hernia was noted. The scope was returned to a neutral position and the stomach was decompressed. The edge of the ulcer away from the exudate was biopsied. A clip was applied for hemostasis. The mucosa of the esophagus was inspected while withdrawing the scope. No abnormalities were noted. The scope was withdrawn from the patient. The bite block was removed. The patient tolerated the procedure with no immediately apparent complication. The patient was taken to recovery in stable condition.    FOLLOW UP:  RECOMMENDATIONS Will call with pathology results. Follow up EGD in 4 weeks. Discussed with Dr. Song and patient's wife.

## 2021-06-18 NOTE — ANESTHESIA CARE TRANSFER NOTE
Patient: Aaron Li    Procedure(s):  ESOPHAGOGASTRODUODENOSCOPY, WITH BIOPSY    Diagnosis: Hematemesis, presence of nausea not specified [K92.0]  Gastrointestinal hemorrhage, unspecified gastrointestinal hemorrhage type [K92.2]  Diagnosis Additional Information: No value filed.    Anesthesia Type:   MAC     Note:      Level of Consciousness: awake  Oxygen Supplementation: room air    Independent Airway: airway patency satisfactory and stable    Vital Signs Stable: post-procedure vital signs reviewed and stable  Report to RN Given: handoff report given  Patient transferred to: PACU    Handoff Report: Identifed the Patient, Identified the Reponsible Provider, Reviewed the pertinent medical history, Discussed the surgical course, Reviewed Intra-OP anesthesia mangement and issues during anesthesia, Set expectations for post-procedure period and Allowed opportunity for questions and acknowledgement of understanding      Vitals: (Last set prior to Anesthesia Care Transfer)  CRNA VITALS  6/18/2021 1438 - 6/18/2021 1509      6/18/2021             Pulse:  70    Ht Rate:  70    SpO2:  92 %    Resp Rate (set):  10        Electronically Signed By: MORGAN KUMAR CRNA  June 18, 2021  3:09 PM

## 2021-06-18 NOTE — PROGRESS NOTES
[facility]  Surgical Progress Note    Assessment & Plan   Aaron Li is a 80 year old male who was admitted on 6/16/2021.     Principal Problem:    GI bleed    Assessment: stable    Plan: hold anticoagulation, SCDs. EGD today. Discussed with patient and his wife yesterday 6/17/21. Will be this afternoon. Clear liquids this am, NPO after 10 am. Questions answered.  Active Problems:    Hematemesis    Stroke/cerebrovascular accident (H)     Melena    Pulmonary nodules    Hematemesis, presence of nausea not specified    Gastrointestinal hemorrhage, unspecified gastrointestinal hemorrhage type      # Pain Assessment:  Current Pain Score 6/18/2021   Patient currently in pain? denies   Aaron fernandez pain level was assessed and he currently denies pain.      DVT Prophylaxis: Pneumatic Compression Devices  Code Status: Full Code    Chelsy Malloy    Interval History   No nausea, vomiting or bowel movements overnight. No dizziness. No shortness of breath.   -Data reviewed today: I reviewed all new labs and imaging results over the last 24 hours.    Physical Exam   Temp: 98.3  F (36.8  C) Temp src: Tympanic BP: 112/65 Pulse: 72   Resp: 16 SpO2: 95 % O2 Device: None (Room air)    Vitals:    06/16/21 1917 06/16/21 2208   Weight: 95.3 kg (210 lb) 91.8 kg (202 lb 6.4 oz)     Vital Signs with Ranges  Temp:  [98.3  F (36.8  C)-99  F (37.2  C)] 98.3  F (36.8  C)  Pulse:  [72-83] 72  Resp:  [16-18] 16  BP: (112-138)/(57-75) 112/65  SpO2:  [95 %-98 %] 95 %  I/O last 3 completed shifts:  In: 1766 [I.V.:1766]  Out: 725 [Urine:725]    Constitutional: No acute distress, pleasant and cooperative  Respiratory: Clear lungs, no respiratory distress  Cardiovascular: regular rate and rhythm, no murmur  GI: abdomen soft, no tenderness, positive bowel sounds  Skin/Integument: pink warm and dry, no rash    Medications     lactated ringers       - MEDICATION INSTRUCTIONS -       - MEDICATION INSTRUCTIONS -       sodium chloride 75  mL/hr at 06/18/21 0118       amLODIPine  5 mg Oral BID     atorvastatin  40 mg Oral QPM     doxazosin  2 mg Oral At Bedtime     finasteride  5 mg Oral Daily     pantoprazole (PROTONIX) IV  40 mg Intravenous Q12H     sodium chloride (PF)  3 mL Intracatheter Q8H       Data   Recent Labs   Lab 06/18/21  0758 06/17/21  2100 06/17/21  1321 06/17/21  0512 06/16/21  1939   WBC  --   --   --  7.8 8.8   HGB 8.3* 8.0* 8.9* 8.5* 10.1*   MCV  --   --   --  87 87   PLT  --   --   --  183 208   INR  --   --   --   --  1.06   NA  --   --   --  143 140   POTASSIUM  --   --   --  4.3 3.7   CHLORIDE  --   --   --  111* 106   CO2  --   --   --  24 26   BUN  --   --   --  33* 41*   CR  --   --   --  0.85 0.93   ANIONGAP  --   --   --  8 8   SEBASTIÁN  --   --   --  8.1* 9.1   GLC  --   --   --  108* 152*   ALBUMIN  --   --   --   --  3.6   PROTTOTAL  --   --   --   --  6.4   BILITOTAL  --   --   --   --  0.5   ALKPHOS  --   --   --   --  52   ALT  --   --   --   --  18   AST  --   --   --   --  16       No results found for this or any previous visit (from the past 24 hour(s)).

## 2021-06-18 NOTE — ANESTHESIA POSTPROCEDURE EVALUATION
Patient: Aaron Li    Procedure(s):  ESOPHAGOGASTRODUODENOSCOPY, WITH BIOPSY    Diagnosis:Hematemesis, presence of nausea not specified [K92.0]  Gastrointestinal hemorrhage, unspecified gastrointestinal hemorrhage type [K92.2]  Diagnosis Additional Information: No value filed.    Anesthesia Type:  MAC    Note:  Disposition: Inpatient   Postop Pain Control: Uneventful            Sign Out: Well controlled pain   PONV: No   Neuro/Psych: Uneventful            Sign Out: Acceptable/Baseline neuro status   Airway/Respiratory: Uneventful            Sign Out: Acceptable/Baseline resp. status   CV/Hemodynamics: Uneventful            Sign Out: Acceptable CV status; No obvious hypovolemia; No obvious fluid overload   Other NRE: NONE   DID A NON-ROUTINE EVENT OCCUR? No           Last vitals:  Vitals:    06/18/21 0736 06/18/21 0943 06/18/21 1307   BP: 112/65 116/73 118/61   Pulse: 72 74 74   Resp: 16 20 20   Temp: 98.3  F (36.8  C) 99.3  F (37.4  C) 98.9  F (37.2  C)   SpO2: 95% 96% 95%       Last vitals prior to Anesthesia Care Transfer:  CRNA VITALS  6/18/2021 1438 - 6/18/2021 1514      6/18/2021             Pulse:  70    Ht Rate:  70    SpO2:  92 %    Resp Rate (set):  10          Electronically Signed By: MORGAN KUMAR CRNA  June 18, 2021  3:14 PM

## 2021-06-18 NOTE — OR NURSING
PACU Respiratory Event Documentation     1) Episodes of Apnea greater than or equal to 10 seconds: no    2) Bradypnea - less than 8 breaths per minute: no    3) Pain score on 0 to 10 scale: 0    4) Pain-sedation mismatch (yes or no): no    5) Repeated 02 desaturation less than 90% (yes or no): no    Anesthesia notified? (yes or no): no    Any of the above events occuring repeatedly in separate 30 minute intervals may be considered recurrent PACU respiratory events.      Report given to Jaime TURPIN

## 2021-06-18 NOTE — PLAN OF CARE
"Pt A & O x 4, afebrile, VSS. Lungs clear. Abdomen rounded, firm, tender upon palpation, denies nausea. Pt able to pass flatus no BM since Wednesday per pt.  Pt ambulates in room with SBA, steady gait, stands at bedside to use urinal, appropriate call light use, no bed alarm in place. Pt denies pain, will continue to monitor.      /69   Pulse 74   Temp 98.5  F (36.9  C) (Tympanic)   Resp 16   Ht 1.753 m (5' 9\")   Wt 91.8 kg (202 lb 6.4 oz)   SpO2 96%   BMI 29.89 kg/m      "

## 2021-06-18 NOTE — ANESTHESIA PREPROCEDURE EVALUATION
Anesthesia Pre-Procedure Evaluation    Patient: Aaron Li   MRN: [id number] : 1940        Preoperative Diagnosis: Hematemesis, presence of nausea not specified [K92.0]  Gastrointestinal hemorrhage, unspecified gastrointestinal hemorrhage type [K92.2]   Procedure : Procedure(s):  ESOPHAGOGASTRODUODENOSCOPY, WITH BIOPSY     History reviewed. No pertinent past medical history.   History reviewed. No pertinent surgical history.   No Known Allergies   Social History     Tobacco Use     Smoking status: Not on file   Substance Use Topics     Alcohol use: Not on file      Wt Readings from Last 1 Encounters:   21 91.8 kg (202 lb 6.4 oz)        Anesthesia Evaluation   Pt has had prior anesthetic.         ROS/MED HX  ENT/Pulmonary: Comment: Pulmonary nodules       Neurologic:     (+) CVA, TIA,     Cardiovascular:     (+) hypertension-----    METS/Exercise Tolerance: 4 - Raking leaves, gardening    Hematologic:  - neg hematologic  ROS     Musculoskeletal:  - neg musculoskeletal ROS     GI/Hepatic: Comment: GI Bleed      Renal/Genitourinary:  - neg Renal ROS     Endo:  - neg endo ROS   (+) Obesity,     Psychiatric/Substance Use:  - neg psychiatric ROS     Infectious Disease:  - neg infectious disease ROS     Malignancy:  - neg malignancy ROS     Other:  - neg other ROS          Physical Exam    Airway        Mallampati: II   TM distance: > 3 FB   Neck ROM: full   Mouth opening: > 3 cm    Respiratory Devices and Support         Dental  no notable dental history         Cardiovascular   cardiovascular exam normal       Rhythm and rate: regular and normal     Pulmonary   pulmonary exam normal        breath sounds clear to auscultation           OUTSIDE LABS:  CBC:   Lab Results   Component Value Date    WBC 7.8 2021    WBC 8.8 2021    HGB 8.3 (L) 2021    HGB 8.0 (L) 2021    HCT 26.3 (L) 2021    HCT 30.4 (L) 2021     2021     2021     BMP:   Lab Results    Component Value Date     06/17/2021     06/16/2021    POTASSIUM 4.3 06/17/2021    POTASSIUM 3.7 06/16/2021    CHLORIDE 111 (H) 06/17/2021    CHLORIDE 106 06/16/2021    CO2 24 06/17/2021    CO2 26 06/16/2021    BUN 33 (H) 06/17/2021    BUN 41 (H) 06/16/2021    CR 0.85 06/17/2021    CR 0.93 06/16/2021     (H) 06/17/2021     (H) 06/16/2021     COAGS:   Lab Results   Component Value Date    PTT 28 06/16/2021    INR 1.06 06/16/2021     POC: No results found for: BGM, HCG, HCGS  HEPATIC:   Lab Results   Component Value Date    ALBUMIN 3.6 06/16/2021    PROTTOTAL 6.4 06/16/2021    ALT 18 06/16/2021    AST 16 06/16/2021    ALKPHOS 52 06/16/2021    BILITOTAL 0.5 06/16/2021     OTHER:   Lab Results   Component Value Date    LACT 1.6 06/16/2021    SEBASTIÁN 8.1 (L) 06/17/2021       Anesthesia Plan    ASA Status:  3   NPO Status:  NPO Appropriate    Anesthesia Type: MAC.     - Reason for MAC: chronic cardiopulmonary disease, straight local not clinically adequate              Consents    Anesthesia Plan(s) and associated risks, benefits, and realistic alternatives discussed. Questions answered and patient/representative(s) expressed understanding.     - Discussed with:  Patient      - Extended Intubation/Ventilatory Support Discussed: No.      - Patient is DNR/DNI Status: No    Use of blood products discussed: No .     Postoperative Care            Comments:                MORGAN Martin CRNA

## 2021-06-18 NOTE — PROGRESS NOTES
"Pt arrived back to room 301 on med surg @ 1540. Pt vss, denies pain, will continue to monitor.        /67   Pulse 77   Temp 98.7  F (37.1  C) (Tympanic)   Resp 16   Ht 1.753 m (5' 9\")   Wt 91.8 kg (202 lb 6.4 oz)   SpO2 95%   BMI 29.89 kg/m      "

## 2021-06-19 VITALS
HEIGHT: 69 IN | BODY MASS INDEX: 30.2 KG/M2 | SYSTOLIC BLOOD PRESSURE: 113 MMHG | TEMPERATURE: 97.7 F | OXYGEN SATURATION: 97 % | HEART RATE: 80 BPM | RESPIRATION RATE: 16 BRPM | WEIGHT: 203.9 LBS | DIASTOLIC BLOOD PRESSURE: 64 MMHG

## 2021-06-19 LAB
HGB BLD-MCNC: 7.9 G/DL (ref 13.3–17.7)
HGB BLD-MCNC: 9.1 G/DL (ref 13.3–17.7)

## 2021-06-19 PROCEDURE — 99238 HOSP IP/OBS DSCHRG MGMT 30/<: CPT | Performed by: FAMILY MEDICINE

## 2021-06-19 PROCEDURE — 85018 HEMOGLOBIN: CPT | Performed by: FAMILY MEDICINE

## 2021-06-19 PROCEDURE — 250N000013 HC RX MED GY IP 250 OP 250 PS 637: Performed by: FAMILY MEDICINE

## 2021-06-19 PROCEDURE — 36415 COLL VENOUS BLD VENIPUNCTURE: CPT | Performed by: FAMILY MEDICINE

## 2021-06-19 PROCEDURE — 250N000011 HC RX IP 250 OP 636: Performed by: FAMILY MEDICINE

## 2021-06-19 PROCEDURE — 99232 SBSQ HOSP IP/OBS MODERATE 35: CPT | Mod: 25 | Performed by: SURGERY

## 2021-06-19 RX ORDER — PANTOPRAZOLE SODIUM 40 MG/1
40 TABLET, DELAYED RELEASE ORAL
Status: DISCONTINUED | OUTPATIENT
Start: 2021-06-19 | End: 2021-06-19 | Stop reason: HOSPADM

## 2021-06-19 RX ORDER — PANTOPRAZOLE SODIUM 40 MG/1
40 TABLET, DELAYED RELEASE ORAL
Qty: 30 TABLET | Refills: 3 | Status: SHIPPED | OUTPATIENT
Start: 2021-06-20

## 2021-06-19 RX ORDER — FUROSEMIDE 10 MG/ML
20 INJECTION INTRAMUSCULAR; INTRAVENOUS ONCE
Status: COMPLETED | OUTPATIENT
Start: 2021-06-19 | End: 2021-06-19

## 2021-06-19 RX ORDER — FERROUS SULFATE 325(65) MG
325 TABLET, DELAYED RELEASE (ENTERIC COATED) ORAL DAILY
Status: DISCONTINUED | OUTPATIENT
Start: 2021-06-19 | End: 2021-06-19 | Stop reason: HOSPADM

## 2021-06-19 RX ORDER — SUCRALFATE ORAL 1 G/10ML
1 SUSPENSION ORAL
Qty: 420 ML | Refills: 3 | Status: SHIPPED | OUTPATIENT
Start: 2021-06-19 | End: 2021-06-29

## 2021-06-19 RX ORDER — SUCRALFATE 1 G/1
1 TABLET ORAL
Status: DISCONTINUED | OUTPATIENT
Start: 2021-06-19 | End: 2021-06-19 | Stop reason: HOSPADM

## 2021-06-19 RX ADMIN — FUROSEMIDE 20 MG: 10 INJECTION, SOLUTION INTRAMUSCULAR; INTRAVENOUS at 10:40

## 2021-06-19 RX ADMIN — AMLODIPINE BESYLATE 5 MG: 5 TABLET ORAL at 10:44

## 2021-06-19 RX ADMIN — PANTOPRAZOLE SODIUM 40 MG: 40 TABLET, DELAYED RELEASE ORAL at 12:18

## 2021-06-19 RX ADMIN — SUCRALFATE 1 G: 1 TABLET ORAL at 10:45

## 2021-06-19 RX ADMIN — FERROUS SULFATE TAB EC 325 MG (65 MG FE EQUIVALENT) 325 MG: 325 (65 FE) TABLET DELAYED RESPONSE at 12:18

## 2021-06-19 RX ADMIN — FINASTERIDE 10 MG: 5 TABLET, FILM COATED ORAL at 10:45

## 2021-06-19 ASSESSMENT — MIFFLIN-ST. JEOR: SCORE: 1630.26

## 2021-06-19 ASSESSMENT — ACTIVITIES OF DAILY LIVING (ADL)
ADLS_ACUITY_SCORE: 16

## 2021-06-19 NOTE — PROGRESS NOTES
[facility]  Surgical Progress Note    Assessment & Plan   Aaron Li is a 80 year old male who was admitted on 6/16/2021.     Principal Problem:    GI bleed    Assessment: stable    Plan: no active bleeding, advance diet. Recheck hgb after lasix, suspect dilutional. Follow up with me in 3 weeks. PPI and carafate for healing. We will call with pathology next week. Patient will seek care at ED if he has recurrent vomiting of blood, clots in his stool, abdominal pain or increased shortness of breath, chest pain or dizziness.. Ok to shower before he goes home.   Active Problems:    Hematemesis    Stroke/cerebrovascular accident (H)    Melena    Pulmonary nodules    Hematemesis, presence of nausea not specified    Gastrointestinal hemorrhage, unspecified gastrointestinal hemorrhage type    # Pain Assessment:  Current Pain Score 6/19/2021   Patient currently in pain? denies   Aaron fernandez pain level was assessed and he currently denies pain.      DVT Prophylaxis: Defer to primary service  Code Status: Full Code    Chelsy Malloy    Interval History   No nausea, vomiting. No pain. No stools.  -Data reviewed today: I reviewed all new labs and imaging results over the last 24 hours.    Physical Exam   Temp: 97.7  F (36.5  C) Temp src: Oral BP: 113/64 Pulse: 80   Resp: 16 SpO2: 97 % O2 Device: None (Room air)    Vitals:    06/16/21 1917 06/16/21 2208 06/19/21 0611   Weight: 95.3 kg (210 lb) 91.8 kg (202 lb 6.4 oz) 92.5 kg (203 lb 14.4 oz)     Vital Signs with Ranges  Temp:  [97.7  F (36.5  C)-99.4  F (37.4  C)] 97.7  F (36.5  C)  Pulse:  [69-80] 80  Resp:  [16-20] 16  BP: ()/(54-69) 113/64  SpO2:  [91 %-97 %] 97 %  I/O last 3 completed shifts:  In: 1892 [P.O.:120; I.V.:1772]  Out: 1200 [Urine:1200]    Constitutional: No acute distress, pleasant and cooperative  Respiratory: Clear lungs, no respiratory distress  Cardiovascular: regular rate and rhythm, no murmur  GI: abdomen soft, no tenderness, positive  bowel sounds  Skin/Integument: pink warm and dry, no rash    Medications     sodium chloride 75 mL/hr at 06/18/21 1714       amLODIPine  5 mg Oral BID     atorvastatin  40 mg Oral QPM     doxazosin  2 mg Oral At Bedtime     ferrous sulfate  325 mg Oral Daily     finasteride  5 mg Oral Daily     furosemide  20 mg Intravenous Once     pantoprazole  40 mg Oral QAM AC     sucralfate  1 g Oral TID AC       Data   Recent Labs   Lab 06/19/21  0734 06/18/21  0758 06/17/21  2100 06/17/21  0512 06/17/21  0512 06/16/21  1939   WBC  --   --   --   --  7.8 8.8   HGB 7.9* 8.3* 8.0*   < > 8.5* 10.1*   MCV  --   --   --   --  87 87   PLT  --   --   --   --  183 208   INR  --   --   --   --   --  1.06   NA  --   --   --   --  143 140   POTASSIUM  --   --   --   --  4.3 3.7   CHLORIDE  --   --   --   --  111* 106   CO2  --   --   --   --  24 26   BUN  --   --   --   --  33* 41*   CR  --   --   --   --  0.85 0.93   ANIONGAP  --   --   --   --  8 8   SEBASTIÁN  --   --   --   --  8.1* 9.1   GLC  --   --   --   --  108* 152*   ALBUMIN  --   --   --   --   --  3.6   PROTTOTAL  --   --   --   --   --  6.4   BILITOTAL  --   --   --   --   --  0.5   ALKPHOS  --   --   --   --   --  52   ALT  --   --   --   --   --  18   AST  --   --   --   --   --  16    < > = values in this interval not displayed.       No results found for this or any previous visit (from the past 24 hour(s)).

## 2021-06-19 NOTE — PHARMACY - DISCHARGE MEDICATION RECONCILIATION AND EDUCATION
Pharmacy:  Discharge Counseling and Medication Reconciliation    Aaron Li  [address] [facility] [address]  [phone] (home) [phone] (work)  80 year old male  PCP: Matthew Prieto    Allergies: Patient has no known allergies.    Discharge Counseling:    Pharmacist met with patient (and/or family) today to review the medication portion of the After Visit Summary (with an emphasis on NEW medications) and to address patient's questions/concerns.    Summary of Education: Benefit of discharge meds for GI bleed was explained. Information regarding dosing instructions and common adverse effects was provided. A clarification for sucralfate TID dosing even if patient were not to eat three meals in a day was also provided. Patient was also advised to discontinue Plavix and avoid NSAIDs/ASA as use may increase GI bleeding risk. It was also advised to monitor for signs of another bleed. Patient was assessed for further questions on discharge meds (no questions).     Materials Provided:  MedCounselor sheets printed from Clinical Pharmacology on: Protonix, sucralfate     Discharge Medication Reconciliation:    It has been determined that the patient has an adequate supply of medications available or which can be obtained from the patient's preferred pharmacy, which HE/SHE has confirmed as: [facility] [An updated medication list will be faxed to the patient's pharmacy.]    Thank you for the consult.    Jn Pool........June 19, 2021 2:02 PM

## 2021-06-19 NOTE — PROGRESS NOTES
Patient remained stable with no problems.  Visited with he and his wife.  We will plan on discharging later today pending his hemoglobin.

## 2021-06-19 NOTE — DISCHARGE SUMMARY
[facility]    Discharge Summary  Hospitalist    Date of Admission:  6/16/2021  Date of Discharge:  6/19/2021  Discharging Provider: GABRIELLE MONTGOMERY  Date of Service (when I saw the patient): 06/19/21    Discharge Diagnoses   Principal Problem:    GI bleed (6/16/2021)  Active Problems:    Hematemesis (6/16/2021)    Stroke/cerebrovascular accident (H) (4/26/2011)    Melena (6/16/2021)    Pulmonary nodules (6/17/2021)    Hematemesis, presence of nausea not specified (6/16/2021)    Gastrointestinal hemorrhage, unspecified gastrointestinal hemorrhage type (6/16/2021)    Acute Blood loss Anemia secondary to probable bleeding Gastric ulcer-POA    History of Present Illness   Aaron Li is an 80 year old male who presented with melena, vomiting blood, and anemia.    Hospital Course   Aaron Li was admitted on 6/16/2021.  The following problems were addressed during his hospitalization:    Patient was admitted to the hospital with the above findings.  He remained hemodynamically stable throughout.  He underwent endoscopy with Dr. Malloy which revealed gastric ulcer in the cardia of the stomach.  There was no active bleeding.  Biopsies were done.  He did not require transfusion.  By the time of discharge his hemoglobin was stable, increasing from 7.9-9.1.  There were no other problems or complications.    His other health problems remained stable throughout.  He had been on Plavix and was instructed to discontinue this.  He had been on it for approximately 10 years.    By the time of discharge he had returned to his baseline status and there was no evidence of any further bleeding.    GABRIELLE MONTGOMERY MD    Significant Results and Procedures   Laboratory studies as noted above, upper GI endoscopy with Dr. Malloy.  Pathology is pending on the biopsies.    Pending Results   These results will be followed up by Dr. Malloy, and his primary physician in [place],   [place]  Unresulted Labs Ordered in the Past 30 Days of this Admission     No orders found from 5/17/2021 to 6/17/2021.          Code Status   Full Code       Primary Care Physician   Matthew Prieto    Physical Exam   Temp: 97.7  F (36.5  C) Temp src: Oral BP: 113/64 Pulse: 80   Resp: 16 SpO2: 97 % O2 Device: None (Room air)    Vitals:    06/16/21 1917 06/16/21 2208 06/19/21 0611   Weight: 95.3 kg (210 lb) 91.8 kg (202 lb 6.4 oz) 92.5 kg (203 lb 14.4 oz)     Vital Signs with Ranges  Temp:  [97.7  F (36.5  C)-99.4  F (37.4  C)] 97.7  F (36.5  C)  Pulse:  [69-80] 80  Resp:  [16-18] 16  BP: ()/(54-69) 113/64  SpO2:  [91 %-97 %] 97 %  I/O last 3 completed shifts:  In: 1892 [P.O.:120; I.V.:1772]  Out: 1200 [Urine:1200]    Constitutional: Is awake and alert, sitting up in the edge of the bed in no apparent distress    Respiratory: Lungs are clear to auscultation  Cardiovascular: Regular rhythm, no murmur gallop  GI: Abdomen is soft and nontender    Discharge Disposition   Discharged to home  Condition at discharge: Stable    Consultations This Hospital Stay   None    Time Spent on this Encounter   I, GABRIELLE MONTGOMERY MD, personally saw the patient today and spent less than or equal to 30 minutes discharging this patient.    Discharge Orders      Reason for your hospital stay    You had a bleeding ulcer in the stomach.     Follow-up and recommended labs and tests     You should make an appointment with your local doctor in [place] in the next week or so.  An appointment has been made for you with Dr. Malloy, here, on July 13.  If you wish, you can be seen locally for that appointment and cancel the appointment with Dr. Malloy.  If you are able, it may be wise to see her here, so she can go over biopsies with you and also make recommendations for further treatment and how long to stay on the anti ulcer medication.     Activity    Your activity upon discharge: activity as tolerated     Discharge  Instructions    Medication that you are on previously will remain the same.  The only difference will be that you should stop taking Plavix.  The 2 new medications are Carafate which you take 3 times daily before meals, and Protonix which you take once a day in the morning.  The Protonix decreases stomach acid production, and the Carafate coats the stomach.  These 2 prescriptions have been [facility].  [facility] have your [facility] in the prescription.  If you plan to see Dr. Malloy in follow-up, you can wait to send in a new prescription until you see how long she suggest that you stay on the medication.  You should also avoid aspirin, ibuprofen, and Aleve.  If you need something for pain you can take Tylenol safely.     When to contact your care team    If you have evidence of rebleeding, such as black stools, blood in the stool, or vomiting blood.  Also if you have increased abdominal pain.     Full Code     Diet    Follow this diet upon discharge: Orders Placed This Encounter      Regular Diet Adult-there are no restrictions on your diet at all.     Discharge Medications   Current Discharge Medication List      START taking these medications    Details   pantoprazole (PROTONIX) 40 MG EC tablet Take 1 tablet (40 mg) by mouth every morning (before breakfast)  Qty: 30 tablet, Refills: 3    Associated Diagnoses: Gastrointestinal hemorrhage, unspecified gastrointestinal hemorrhage type      sucralfate (CARAFATE) 1 GM/10ML suspension Take 10 mLs (1 g) by mouth 3 times daily (before meals)  Qty: 420 mL, Refills: 3    Associated Diagnoses: Gastrointestinal hemorrhage, unspecified gastrointestinal hemorrhage type         CONTINUE these medications which have NOT CHANGED    Details   amLODIPine (NORVASC) 10 MG tablet Take 5 mg by mouth 2 times daily      atorvastatin (LIPITOR) 80 MG tablet Take 40 mg by mouth every evening       BONE MEAL-VITAMIN D PO  Take 3 tablets by mouth daily      doxazosin (CARDURA) 4 MG tablet Take 2 mg by mouth At Bedtime      finasteride (PROSCAR) 5 MG tablet Take 5 mg by mouth daily      fish oil-omega-3 fatty acids 1000 MG capsule Take 1 g by mouth daily       Glucosamine Sulfate 1000 MG TABS Take 1 tablet by mouth 2 times daily       metFORMIN (GLUCOPHAGE) 1000 MG tablet Take 500 mg by mouth 2 times daily (with meals)      multivitamin w/minerals (MULTI-VITAMIN) tablet Take 1 tablet by mouth daily      Vitamin D, Cholecalciferol, 25 MCG (1000 UT) TABS Take 1 tablet by mouth 2 times daily          STOP taking these medications       clopidogrel (PLAVIX) 75 MG tablet Comments:   Reason for Stopping:             Allergies   No Known Allergies  Data   Most Recent 3 CBC's:  Recent Labs   Lab Test 06/19/21  1245 06/19/21  0734 06/18/21  0758 06/17/21  0512 06/17/21  0512 06/16/21 1939   WBC  --   --   --   --  7.8 8.8   HGB 9.1* 7.9* 8.3*   < > 8.5* 10.1*   MCV  --   --   --   --  87 87   PLT  --   --   --   --  183 208    < > = values in this interval not displayed.      Most Recent 3 BMP's:  Recent Labs   Lab Test 06/17/21  0512 06/16/21 1939    140   POTASSIUM 4.3 3.7   CHLORIDE 111* 106   CO2 24 26   BUN 33* 41*   CR 0.85 0.93   ANIONGAP 8 8   SEBASTIÁN 8.1* 9.1   * 152*     Most Recent 2 LFT's:  Recent Labs   Lab Test 06/16/21 1939   AST 16   ALT 18   ALKPHOS 52   BILITOTAL 0.5     Most Recent INR's and Anticoagulation Dosing History:  Anticoagulation Dose History     Recent Dosing and Labs Latest Ref Rng & Units 6/16/2021    INR 0.86 - 1.14 1.06        Most Recent 3 Troponin's:No lab results found.  Most Recent Cholesterol Panel:No lab results found.  Most Recent 6 Bacteria Isolates From Any Culture (See EPIC Reports for Culture Details):No lab results found.  Most Recent TSH, T4 and A1c Labs:No lab results found.  No results found for this or any previous visit.

## 2021-06-19 NOTE — PHARMACY
[facility]  [facility] of [facility]  [address] [place] [id number]    June 19, 2021    Dear Pharmacist,    Your customer, Aaron Li, born on 1940, was recently discharged from [facility].  We have updated his medication list and want to alert you to the following:       Review of your medicines      START taking      Dose / Directions   pantoprazole 40 MG EC tablet  Commonly known as: PROTONIX  Used for: Gastrointestinal hemorrhage, unspecified gastrointestinal hemorrhage type      Dose: 40 mg  Start taking on: June 20, 2021  Take 1 tablet (40 mg) by mouth every morning (before breakfast)  Quantity: 30 tablet  Refills: 3     sucralfate 1 GM/10ML suspension  Commonly known as: CARAFATE  Used for: Gastrointestinal hemorrhage, unspecified gastrointestinal hemorrhage type      Dose: 1 g  Take 10 mLs (1 g) by mouth 3 times daily (before meals)  Quantity: 420 mL  Refills: 3        CONTINUE these medicines which have NOT CHANGED      Dose / Directions   amLODIPine 10 MG tablet  Commonly known as: NORVASC      Dose: 5 mg  Take 5 mg by mouth 2 times daily  Refills: 0     atorvastatin 80 MG tablet  Commonly known as: LIPITOR      Dose: 40 mg  Take 40 mg by mouth every evening  Refills: 0     BONE MEAL-VITAMIN D PO      Dose: 3 tablet  Take 3 tablets by mouth daily  Refills: 0     doxazosin 4 MG tablet  Commonly known as: CARDURA      Dose: 2 mg  Take 2 mg by mouth At Bedtime  Refills: 0     finasteride 5 MG tablet  Commonly known as: PROSCAR      Dose: 5 mg  Take 5 mg by mouth daily  Refills: 0     fish oil-omega-3 fatty acids 1000 MG capsule      Dose: 1 g  Take 1 g by mouth daily  Refills: 0     Glucosamine Sulfate 1000 MG Tabs      Dose: 1 tablet  Take 1 tablet by mouth 2 times daily  Refills: 0     metFORMIN 1000 MG tablet  Commonly known as: GLUCOPHAGE      Dose: 500 mg  Take 500 mg by mouth 2 times daily (with meals)  Refills: 0     Multi-vitamin  tablet      Dose: 1 tablet  Take 1 tablet by mouth daily  Refills: 0     Vitamin D (Cholecalciferol) 25 MCG (1000 UT) Tabs      Dose: 1 tablet  Take 1 tablet by mouth 2 times daily  Refills: 0        STOP taking    clopidogrel 75 MG tablet  Commonly known as: PLAVIX              Where to get your medicines      These medications were sent to [id number] - [place], [place] - [address], [address], located within [facility] [address]    Phone: [phone]     pantoprazole 40 MG EC tablet    sucralfate 1 GM/10ML suspension         We also reviewed Aaron Li's allergy list and updated it as needed:  Allergies: Patient has no known allergies.    Thank you for continuing to care for Aaron Li.  We look forward to working together with you in the future.    Sincerely,  Jn Pool  [facility]

## 2021-06-21 ENCOUNTER — PATIENT OUTREACH (OUTPATIENT)
Dept: CARE COORDINATION | Facility: CLINIC | Age: 81
End: 2021-06-21

## 2021-06-21 NOTE — PROGRESS NOTES
Clinic Care Coordination Contact    Brief chart review due to recent hospitalization, patient has PCP elsewhere, recommended to follow-up with his PCP in the next week or so.  No TCM call required.    SAIRA Thomas on 6/21/2021 at 8:35 AM

## 2021-06-29 ENCOUNTER — TELEPHONE (OUTPATIENT)
Dept: SURGERY | Facility: OTHER | Age: 81
End: 2021-06-29

## 2021-06-29 DIAGNOSIS — A04.8 H. PYLORI INFECTION: Primary | ICD-10-CM

## 2021-06-29 DIAGNOSIS — K92.2 GASTROINTESTINAL HEMORRHAGE, UNSPECIFIED GASTROINTESTINAL HEMORRHAGE TYPE: ICD-10-CM

## 2021-06-29 DIAGNOSIS — K92.2 GASTROINTESTINAL HEMORRHAGE, UNSPECIFIED GASTROINTESTINAL HEMORRHAGE TYPE: Primary | ICD-10-CM

## 2021-06-29 RX ORDER — CLARITHROMYCIN 500 MG
500 TABLET ORAL 2 TIMES DAILY
Qty: 28 TABLET | Refills: 0 | Status: SHIPPED | OUTPATIENT
Start: 2021-06-29

## 2021-06-29 RX ORDER — CLARITHROMYCIN 500 MG
500 TABLET ORAL 2 TIMES DAILY
Qty: 28 TABLET | Refills: 0 | Status: SHIPPED | OUTPATIENT
Start: 2021-06-29 | End: 2021-06-29

## 2021-06-29 RX ORDER — AMOXICILLIN 500 MG/1
1000 CAPSULE ORAL 2 TIMES DAILY
Qty: 56 CAPSULE | Refills: 0 | Status: SHIPPED | OUTPATIENT
Start: 2021-06-29 | End: 2021-07-13

## 2021-06-29 RX ORDER — SUCRALFATE 1 G/1
1 TABLET ORAL
Qty: 120 TABLET | Refills: 3 | Status: SHIPPED | OUTPATIENT
Start: 2021-06-29

## 2021-06-29 RX ORDER — SUCRALFATE ORAL 1 G/10ML
1 SUSPENSION ORAL
Qty: 420 ML | Refills: 3 | Status: SHIPPED | OUTPATIENT
Start: 2021-06-29 | End: 2021-06-29

## 2021-06-29 NOTE — TELEPHONE ENCOUNTER
Needs clarification---Carafate was ordered as an extension-and is very expensive can they switch to tablets due to availability and cost

## 2021-07-12 ENCOUNTER — TELEPHONE (OUTPATIENT)
Dept: SURGERY | Facility: OTHER | Age: 81
End: 2021-07-12

## 2021-07-12 NOTE — TELEPHONE ENCOUNTER
Aaron's wife Arminda called and left a VM asking if they can not come to their appointment with [facility] tomorrow, and instead, see their primary in [place].  Please call them back to advise.    Thanks  Ophelia Espitia on 7/12/2021 at 1:34 PM

## 2021-07-13 NOTE — TELEPHONE ENCOUNTER
Called patient's wife back with the response as noted.  They had already cancelled his appointment because he is doing well.  He will follow up with his PCP.  Nicole Duran LPN .......7/13/2021 10:18 AM

## (undated) DEVICE — ENDO BITE BLOCK 60 MAXI LF 00712804

## (undated) DEVICE — CLIP ENDO RESOLUTION 2.8MMX235CM M00522610

## (undated) DEVICE — TUBING SUCTION 10'X3/16" N510

## (undated) DEVICE — SOL WATER 1500ML

## (undated) DEVICE — ENDO KIT COMPLIANCE DYKENDOCMPLY

## (undated) DEVICE — SUCTION MANIFOLD NEPTUNE 2 SYS 4 PORT 0702-020-000

## (undated) DEVICE — HEMOCLIP QUICKCLIP PRO OLYMPUS 230CM HX-202UR.B

## (undated) DEVICE — SYR 50ML LL W/O NDL 309653

## (undated) RX ORDER — SODIUM CHLORIDE 9 MG/ML
INJECTION, SOLUTION INTRAVENOUS
Status: DISPENSED
Start: 2021-06-16

## (undated) RX ORDER — PROPOFOL 10 MG/ML
INJECTION, EMULSION INTRAVENOUS
Status: DISPENSED
Start: 2021-06-18

## (undated) RX ORDER — PANTOPRAZOLE SODIUM 40 MG/10ML
INJECTION, POWDER, LYOPHILIZED, FOR SOLUTION INTRAVENOUS
Status: DISPENSED
Start: 2021-06-16